# Patient Record
Sex: MALE | Race: WHITE
[De-identification: names, ages, dates, MRNs, and addresses within clinical notes are randomized per-mention and may not be internally consistent; named-entity substitution may affect disease eponyms.]

---

## 2018-03-01 ENCOUNTER — HOSPITAL ENCOUNTER (OUTPATIENT)
Dept: HOSPITAL 63 - CT | Age: 53
Discharge: HOME | End: 2018-03-01
Attending: FAMILY MEDICINE
Payer: COMMERCIAL

## 2018-03-01 DIAGNOSIS — M51.37: ICD-10-CM

## 2018-03-01 DIAGNOSIS — K42.9: Primary | ICD-10-CM

## 2018-03-01 DIAGNOSIS — N28.1: ICD-10-CM

## 2018-03-01 PROCEDURE — 74177 CT ABD & PELVIS W/CONTRAST: CPT

## 2018-03-01 RX ADMIN — IOHEXOL ONE ML: 300 INJECTION, SOLUTION INTRAVENOUS at 09:11

## 2018-03-01 NOTE — RAD
CT of the abdomen and pelvis with contrast, 3/1/2018:



History: Umbilical hernia



Multidetector CT imaging was performed following oral and IV administration of

contrast.



No hepatic abnormality is evident.  The gallbladder is unremarkable.  No

pancreatic abnormality is seen.  Calcified splenic granulomata are present. 

The spleen is of normal size.  The kidneys show no evidence of obstruction.  2

tiny subcentimeter cortical renal cysts are present in the left.



There is mild aortic calcific plaquing without evidence of aneurysm.  No

abdominal or pelvic adenopathy is evident.  



The bowel loops are of normal caliber.  The appendix is visualized and shows

no abnormality.  No free air or free fluid is evident in the abdomen or

pelvis.



There is a small fascial defect at the umbilical level containing only fat. 

No bowel herniation is evident.



Moderate degenerative disc disease is present at L5-S1.





IMPRESSION:

1.  Small umbilical hernia containing only fat.

2.  Tiny left renal cysts.

3.  No acute abdominal or pelvic abnormality is detected.











[employer] Compliance Statement:



One or more of the following individualized dose reduction techniques were

utilized for this examination:

1. Automated exposure control

2. Adjustment of the mA and/or kV according to patient size

3. Use of iterative reconstruction technique

## 2019-03-13 ENCOUNTER — APPOINTMENT (RX ONLY)
Dept: URBAN - METROPOLITAN AREA CLINIC 39 | Facility: CLINIC | Age: 54
Setting detail: DERMATOLOGY
End: 2019-03-13

## 2019-03-13 DIAGNOSIS — D22 MELANOCYTIC NEVI: ICD-10-CM

## 2019-03-13 DIAGNOSIS — L82.1 OTHER SEBORRHEIC KERATOSIS: ICD-10-CM

## 2019-03-13 PROBLEM — D22.71 MELANOCYTIC NEVI OF RIGHT LOWER LIMB, INCLUDING HIP: Status: ACTIVE | Noted: 2019-03-13

## 2019-03-13 PROBLEM — D22.72 MELANOCYTIC NEVI OF LEFT LOWER LIMB, INCLUDING HIP: Status: ACTIVE | Noted: 2019-03-13

## 2019-03-13 PROBLEM — D48.5 NEOPLASM OF UNCERTAIN BEHAVIOR OF SKIN: Status: ACTIVE | Noted: 2019-03-13

## 2019-03-13 PROBLEM — L55.1 SUNBURN OF SECOND DEGREE: Status: ACTIVE | Noted: 2019-03-13

## 2019-03-13 PROBLEM — D22.5 MELANOCYTIC NEVI OF TRUNK: Status: ACTIVE | Noted: 2019-03-13

## 2019-03-13 PROCEDURE — 11102 TANGNTL BX SKIN SINGLE LES: CPT

## 2019-03-13 PROCEDURE — ? BIOPSY BY SHAVE METHOD

## 2019-03-13 PROCEDURE — ? COUNSELING

## 2019-03-13 PROCEDURE — 99203 OFFICE O/P NEW LOW 30 MIN: CPT | Mod: 25

## 2019-03-13 ASSESSMENT — LOCATION DETAILED DESCRIPTION DERM
LOCATION DETAILED: RIGHT MEDIAL INFERIOR CHEST
LOCATION DETAILED: LEFT ANTERIOR PROXIMAL THIGH
LOCATION DETAILED: RIGHT VENTRAL DISTAL FOREARM
LOCATION DETAILED: RIGHT MEDIAL SUPERIOR CHEST
LOCATION DETAILED: RIGHT INFERIOR UPPER BACK
LOCATION DETAILED: LEFT VENTRAL DISTAL FOREARM
LOCATION DETAILED: RIGHT ANTERIOR DISTAL THIGH
LOCATION DETAILED: LEFT LATERAL PROXIMAL UPPER ARM
LOCATION DETAILED: RIGHT MEDIAL UPPER BACK

## 2019-03-13 ASSESSMENT — LOCATION SIMPLE DESCRIPTION DERM
LOCATION SIMPLE: LEFT FOREARM
LOCATION SIMPLE: CHEST
LOCATION SIMPLE: RIGHT THIGH
LOCATION SIMPLE: LEFT UPPER ARM
LOCATION SIMPLE: LEFT THIGH
LOCATION SIMPLE: RIGHT UPPER BACK
LOCATION SIMPLE: RIGHT FOREARM

## 2019-03-13 ASSESSMENT — LOCATION ZONE DERM
LOCATION ZONE: TRUNK
LOCATION ZONE: ARM
LOCATION ZONE: LEG

## 2019-03-13 ASSESSMENT — PAIN INTENSITY VAS: HOW INTENSE IS YOUR PAIN 0 BEING NO PAIN, 10 BEING THE MOST SEVERE PAIN POSSIBLE?: NO PAIN

## 2019-03-13 NOTE — PROCEDURE: BIOPSY BY SHAVE METHOD
Anesthesia Volume In Cc (Will Not Render If 0): 1
Was A Bandage Applied: Yes
Post-Care Instructions: I reviewed with the patient in detail post-care instructions. Patient is to keep the biopsy site dry overnight, cleanse with soap and water, and then apply vaseline for at least 3 weeks
Dressing: Band-Aid
Hemostasis: Drysol
Curettage Text: Curettage after biopsy
Biopsy Method: Personna blade
Bill For Surgical Tray: no
Detail Level: Detailed
Notification Instructions: Patient will be notified of biopsy results. However, patient instructed to call the office if not contacted within 2 weeks.
Consent: Patient was informed of risks including but not limited to scarring, infection, bleeding, scabbing, incomplete removal with need for additional therapy/surgery, and allergy to anesthesia.
Lab: [id number]
Wound Care: Vaseline
Lab Facility: [id number]
Additional Anesthesia Volume In Cc (Will Not Render If 0): 0
Size Of Lesion In Cm: 0.4
Anesthesia Type: 1% lidocaine with 1:100,000 epinephrine and a 1:10 solution of 8.4% sodium bicarbonate
Depth Of Biopsy: dermis
Billing Type: Third-Party Bill
Type Of Destruction Used: Curettage
Biopsy Type: H and E

## 2021-07-22 ENCOUNTER — HOSPITAL ENCOUNTER (OUTPATIENT)
Dept: HOSPITAL 63 - RAD | Age: 56
End: 2021-07-22
Attending: FAMILY MEDICINE
Payer: COMMERCIAL

## 2021-07-22 DIAGNOSIS — R31.9: ICD-10-CM

## 2021-07-22 DIAGNOSIS — N28.1: Primary | ICD-10-CM

## 2021-07-22 DIAGNOSIS — D73.89: ICD-10-CM

## 2021-07-22 DIAGNOSIS — M51.37: ICD-10-CM

## 2021-07-22 PROCEDURE — 74176 CT ABD & PELVIS W/O CONTRAST: CPT

## 2021-07-22 NOTE — RAD
CT ABDOMEN+PELVIS WO



History: Left flank pain, hematuria.



Comparison: CT abdomen pelvis 3/1/2018



Technique: Noncontrast CT of the abdomen and pelvis. 



Findings:

The lung bases are clear. A few punctate calcifications are present within the liver. The gallbladder
, pancreas, and adrenal glands are unremarkable. There are punctate calcifications in the spleen. The
re is no hydronephrosis or nephrolithiasis. Tiny 6 mm exophytic cyst of the left kidney. No perinephr
ic inflammatory changes. No hydroureter or ureterolithiasis.



Decompressed stomach. Unremarkable small bowel. Normal appendix. No pericolonic inflammatory fat stra
nding.



The bladder is unremarkable. Normal prostate. No free fluid or intra-abdominal free air. No abdominop
elvic adenopathy.



Soft tissues are unremarkable. No acute or suspicious osseous lesion. Degenerative disc disease at L5
-S1.



Impression: 



1.  No acute findings in the abdomen and pelvis. No nephrolithiasis or hydronephrosis.





------

Exposure: One or more of the following individualized dose reduction techniques were utilized for thi
s examination:  

1. Automated exposure control

2. Adjustment of the mA and/or kV according to patient size

3. Use of iterative reconstruction technique.



Electronically signed by: Donny Pina MD (7/22/2021 5:12 PM) [facility]